# Patient Record
(demographics unavailable — no encounter records)

---

## 2025-01-08 NOTE — HISTORY OF PRESENT ILLNESS
[FreeTextEntry1] : 67 y/o with PMH of atrial fibrillation s/p ablation therapy, carotid bruit, mitral regurgitation, peripheral vascular disease, hyperlipidemia, NIDDM and long-term anticoagulant use presents for follow up. Patient denies chest pain, shortness of breath, dizziness, lightheadedness and edema.

## 2025-01-08 NOTE — DISCUSSION/SUMMARY
[FreeTextEntry1] : This is a 73 -year-old male with past medical history significant for atrial fibrillation, status post ablation, remains on anticoagulation with Xarelto 20 mg, hyperlipidemia, history of gout, murmur, non-insulin-dependent diabetes mellitus, who comes in for followup cardiac evaluation.  He denies chest pain, shortness of breath, dizziness or syncope. Electrocardiogram done January 8, 2025 demonstrate normal sinus rhythm rate 76 bpm is otherwise unremarkable. He will be getting new blood work done with his primary care physician in the next month. Lipid panel done September 17, 2024 demonstrated cholesterol of 159, HDL 39, LDL calculated 88 mg/dL, non-HDL cholesterol 120 mg/dL, and triglycerides 191 mg/dL Echo Doppler examination done at [facility] March 21, 2023 demonstrated normal left ventricular function with estimated ejection fraction of 60 to 65% trace mitral valve regurgitation, mildly thickened aortic valve leaflets, trace tricuspid valve regurgitation. He remain on his usual medications. The patient had a normal exercise stress test June 25, 2024. Lipid panel done March 19, 2024 demonstrated cholesterol 176, HDL of 40, LDL calculated 109, triglycerides 152, non-HDL cholesterol 136 mg/dL. Echo Doppler examination done at [facility] March 24, 2023 demonstrated trace mitral valve regurgitation, mildly thickened aortic valve leaflets with normal opening, trace tricuspid valve regurgitation, satisfactory left ventricular function with estimated ejection fraction of 60 to 65% The patient remained stable from a cardiac standpoint.   Lipid panel done January 13, 2024 demonstrated cholesterol 150, HDL 39, triglycerides 104, LDL calculated 92, non-HDL cholesterol 112, LDL direct 93 mg/dL. The patient will continue on Zetia therapy.  He is not interested in injectable therapy for lipid-lowering at this time. PMH: The patient was hospitalized at [facility] March 20 through March 24, 2023 with inflammation of his liver.  He was treated with antibiotics and steroids and sent home.  He reports that the clinicians felt that this inflammation was secondary to medication.  His hemoglobin A1c at that time was 8.4. He is currently seeing an endocrinologist and is on metformin and low-dose insulin with a goal of being on oral therapy. PMH: He had been taking Nexlezet but he was afraid to continue because of the potential hyperuricemia and increased risk for gout.  I discussed the role of PCSK9 injectable therapy with him.  He prefers to restart Zetia.  He does not wish to rechallenge himself at this time with statin therapy.  He reports that he had a rash in the past to statin therapy. Lipid panel done May 6, 2003 demonstrated cholesterol 170, HDL 32, triglycerides of 248, LDL calculated 89, LDL direct 105, non-HDL cholesterol 138 mg/dL with hemoglobin A1c at that time was 7.9. He will restart Zetia therapy and have repeat lipid profile in 6 months.  If he is against injectable therapy discussion regarding low-dose statin therapy versus the addition of cholestyramine therapy. Electrocardiogram done July 19, 2023 demonstrates normal sinus rhythm rate of 90 bpm and is otherwise unremarkable Lipid panel done January 5, 2023 demonstrate cholesterol 170, triglycerides 195, HDL of 42, LDL calculated 89 mg/dL non-HDL cholesterol 128 mg/dL.  Hemoglobin A1c was 7.4. He is motivated to work with a registered dietitian. Hopefully with lifestyle modification, continued aerobic activity, his lipid profile will improve. I discussed the role for WelChol as an additive to Zetia therapy to further reduce his LDL cholesterol.  He will take this under consideration.  He will have new blood work done June 2023.  Is certainly reasonable to use WelChol as 1 packet/day in addition to his Zetia therapy to further reduce his LDL cholesterol. He continues on Xarelto 20 mg in the evening. The patient had a normal exercise stress test October 14, 2022. Electrocardiogram done May 4, 2022 demonstrated normal sinus rhythm and rate of 82 bpm is otherwise unremarkable. He continues on anticoagulation with Xarelto therapy. He is on Zetia 10 mg daily because he was unable to tolerate statin therapy. Blood work done January fourth 2022 demonstrated hemoglobin A1c of 6.6, cholesterol 174, triglycerides 167, HDL of 45, non-HDL cholesterol 129 mg/dL Electrocardiogram done 9/21/2001 demonstrated normal sinus rhythm rate of 86 bpm is otherwise unremarkable. The patient has been stable on his current dose of Xarelto 20 mg in the evening. He will be following up with Dr. Hayes of electrophysiology.   Echo Doppler examination done July 20, 2020 demonstrated minimal mitral valve regurgitation, mild tricuspid valve regurgitation, minimal pulmonic valve regurgitation and satisfactory left ventricular function. The patient had normal exercise stress test July 28, 2020. The patient understands that aerobic exercises must be increased to 40 minutes 4 times per week. A detailed discussion of lifestyle modification was done today. The patient has a good understanding of the diagnosis, and treatment plan. Lifestyle modification was also outlined.

## 2025-01-08 NOTE — REASON FOR VISIT
[CV Risk Factors and Non-Cardiac Disease] : CV risk factors and non-cardiac disease [Arrhythmia/ECG Abnorrmalities] : arrhythmia/ECG abnormalities [Structural Heart and Valve Disease] : structural heart and valve disease [Other: ____] : [unfilled] [Follow-Up - Clinic] : a clinic follow-up of [Atrial Fibrillation] : atrial fibrillation [Hyperlipidemia] : hyperlipidemia [Mitral Regurgitation] : mitral regurgitation [Peripheral Vascular Disease] : peripheral vascular disease [FreeTextEntry3] : Dr. Mcgee [FreeTextEntry1] : s/p ablation therapy, NIDDM, murmur

## 2025-01-08 NOTE — PHYSICAL EXAM
[Well Developed] : well developed [Well Nourished] : well nourished [No Acute Distress] : no acute distress [Normal Venous Pressure] : normal venous pressure [No Carotid Bruit] : no carotid bruit [Normal S1, S2] : normal S1, S2 [No Rub] : no rub [Murmur] : murmur [Normal Rate] : normal [I] : a grade 1 [Clear Lung Fields] : clear lung fields [Good Air Entry] : good air entry [No Respiratory Distress] : no respiratory distress  [Soft] : abdomen soft [Non Tender] : non-tender [No Masses/organomegaly] : no masses/organomegaly [Normal Bowel Sounds] : normal bowel sounds [Normal Gait] : normal gait [No Edema] : no edema [No Cyanosis] : no cyanosis [No Clubbing] : no clubbing [No Varicosities] : no varicosities [No Rash] : no rash [No Skin Lesions] : no skin lesions [Moves all extremities] : moves all extremities [No Focal Deficits] : no focal deficits [Normal Speech] : normal speech [Alert and Oriented] : alert and oriented [Normal memory] : normal memory [General Appearance - Well Developed] : well developed [Normal Appearance] : normal appearance [Well Groomed] : well groomed [General Appearance - Well Nourished] : well nourished [No Deformities] : no deformities [General Appearance - In No Acute Distress] : no acute distress [Normal Conjunctiva] : the conjunctiva exhibited no abnormalities [Normal Oral Mucosa] : normal oral mucosa [Normal Jugular Venous A Waves Present] : normal jugular venous A waves present [Normal Jugular Venous V Waves Present] : normal jugular venous V waves present [No Jugular Venous Hoskins A Waves] : no jugular venous hoskins A waves [Respiration, Rhythm And Depth] : normal respiratory rhythm and effort [Auscultation Breath Sounds / Voice Sounds] : lungs were clear to auscultation bilaterally [Exaggerated Use Of Accessory Muscles For Inspiration] : no accessory muscle use [Bowel Sounds] : normal bowel sounds [Abdomen Soft] : soft [Abnormal Walk] : normal gait [Gait - Sufficient For Exercise Testing] : the gait was sufficient for exercise testing [Nail Clubbing] : no clubbing of the fingernails [Cyanosis, Localized] : no localized cyanosis [Skin Color & Pigmentation] : normal skin color and pigmentation [Skin Turgor] : normal skin turgor [] : no rash [Oriented To Time, Place, And Person] : oriented to person, place, and time [Affect] : the affect was normal [Mood] : the mood was normal [No Anxiety] : not feeling anxious [5th Left ICS - MCL] : palpated at the 5th LICS in the midclavicular line [Normal] : normal [No Precordial Heave] : no precordial heave was noted [Tachycardia] : tachycardic [Heart Rate ___] : [unfilled] bpm [Rhythm Regular] : regular [Normal S1] : normal S1 [Normal S2] : normal S2 [No Gallop] : no gallop heard [II] : a grade 2 [2+] : left 2+ [No Abnormalities] : the abdominal aorta was not enlarged and no bruit was heard [No Pitting Edema] : no pitting edema present [Right Femoral Bruit] : no bruit heard over the right femoral artery [Left Femoral Bruit] : no bruit heard over the left femoral artery [Apical Thrill] : no thrill palpable at the apex [S3] : no S3 [S4] : no S4 [Click] : no click [Pericardial Rub] : no pericardial rub [Right Carotid Bruit] : no bruit heard over the right carotid [Left Carotid Bruit] : no bruit heard over the left carotid

## 2025-03-11 NOTE — HISTORY OF PRESENT ILLNESS
[de-identified] : This is a 73 year-old gentleman with a history of diabetes mellitus, paroxysmal atrial fibrillation, status post upper GI bleed, who is here today for his annual well examination.

## 2025-03-11 NOTE — ASSESSMENT
[FreeTextEntry1] : Problems Diabetes mellitus Paroxysmal atrial fibrillation Status post upper GI bleed Assessment This is a 73-year-old gentleman with a history of paroxysmal atrial fibs, diabetes mellitus, hypertension who went a cardiac ablation a year ago.  He presently continues on his Antithrombin inhibitor.  His blood pressure and physical examination were normal except that he is mildly overweight and deconditioned.  I advised the patient to lose weight following his diet The patient is due for a CT of the abdomen to assess his adrenal tumor and also use advised to obtain the shingles vaccine

## 2025-03-11 NOTE — HEALTH RISK ASSESSMENT
[Never (0 pts)] : Never (0 points) [No falls in past year] : Patient reported no falls in the past year [0] : 2) Feeling down, depressed, or hopeless: Not at all (0) [PHQ-2 Negative - No further assessment needed] : PHQ-2 Negative - No further assessment needed [[id number]] : 0 [No] : does not take [None] : Patient does not have any barriers to medication adherence [Benzodiazepines] : benzodiazepines [Opioids] : opioids [Blood Thinners] : blood thinners [FreeTextEntry1] : Patient is not on opioids but he is on Xarelto for atrial fibs [Never] : Never [NO] : No [Patient reported colonoscopy was normal] : Patient reported colonoscopy was normal [HIV Test offered] : HIV Test offered [Hepatitis C test offered] : Hepatitis C test offered [Change in mental status noted] : No change in mental status noted [Language] : denies difficulty with language [Behavior] : denies difficulty with behavior [Learning/Retaining New Information] : denies difficulty learning/retaining new information [Handling Complex Tasks] : denies difficulty handling complex tasks [Reasoning] : denies difficulty with reasoning [Spatial Ability and Orientation] : denies difficulty with spatial ability and orientation [With Family] : lives with family [] :  [Fully functional (bathing, dressing, toileting, transferring, walking, feeding)] : Fully functional (bathing, dressing, toileting, transferring, walking, feeding) [Fully functional (using the telephone, shopping, preparing meals, housekeeping, doing laundry, using] : Fully functional and needs no help or supervision to perform IADLs (using the telephone, shopping, preparing meals, housekeeping, doing laundry, using transportation, managing medications and managing finances) [Reports changes in hearing] : Reports no changes in hearing [Reports changes in vision] : Reports no changes in vision [Reports normal functional visual acuity (ie: able to read med bottle)] : Reports normal functional visual acuity [Reports changes in dental health] : Reports no changes in dental health [Smoke Detector] : smoke detector [Carbon Monoxide Detector] : carbon monoxide detector

## 2025-03-11 NOTE — PHYSICAL EXAM
[Normal Sphincter Tone] : normal sphincter tone [No Mass] : no mass [Stool Occult Blood] : stool negative for occult blood [Urethral Meatus] : meatus normal [Urinary Bladder Findings] : the bladder was normal on palpation [Scrotum] : the scrotum was normal [Testes Mass (___cm)] : there were no testicular masses [Anus Abnormality] : the anus and perineum were normal [Rectal Exam - Rectum] : digital rectal exam was normal [Prostate Enlargement] : the prostate was not enlarged [Prostate Tenderness] : the prostate was not tender [No Prostate Nodules] : no prostate nodules [Normal] : affect was normal and insight and judgment were intact [Right Foot Was Examined] : Right foot ~C was examined [Left Foot Was Examined] : left foot ~C was examined [None] : no ulcers in either foot were found [] : both feet

## 2025-07-01 NOTE — REASON FOR VISIT
[CV Risk Factors and Non-Cardiac Disease] : CV risk factors and non-cardiac disease [Arrhythmia/ECG Abnorrmalities] : arrhythmia/ECG abnormalities [Structural Heart and Valve Disease] : structural heart and valve disease [Other: ____] : [unfilled] [FreeTextEntry3] : Dr. Mcgee [Follow-Up - Clinic] : a clinic follow-up of [Atrial Fibrillation] : atrial fibrillation [Hyperlipidemia] : hyperlipidemia [Mitral Regurgitation] : mitral regurgitation [Peripheral Vascular Disease] : peripheral vascular disease [FreeTextEntry1] : s/p ablation therapy, NIDDM, murmur

## 2025-07-01 NOTE — DISCUSSION/SUMMARY
[FreeTextEntry1] : This is a 73 -year-old male with past medical history significant for atrial fibrillation, status post ablation, remains on anticoagulation with Xarelto 20 mg, hyperlipidemia, history of gout, murmur, non-insulin-dependent diabetes mellitus, who comes in for followup cardiac evaluation. The patient is complaining of increased heart rate/palpitations.  He tried to record his heart rate with his Apple phone which demonstrated complete artifact and read a heart rate of 150 bpm. He tried to do the same in the office today where his electrocardiogram demonstrated normal sinus rhythm rate of 96 bpm.  I have asked him to purchase a Kardia device or even use a pulse oximeter.  He is concerned about his increased heart rate. He will have a 2-week event monitor placed today to evaluate his heart rate over the next 2 weeks. He understands he must increase his hydration.  He admits to not drinking enough water and was consuming at least 2 cups of coffee per day.  He denies chest pain, shortness of breath, dizziness or syncope. Electrocardiogram done July 1, 2025 demonstrated normal sinus rhythm rate of 96 bpm is otherwise remarkable for left atrial abnormality. The patient is encouraged to increase his volume intake to 6 to 8 glasses of water per day and I recommended he start with 2 bottles of Pedialyte. Further recommendations will be made after results of the event recorder are available for my review. I once again explained to him we will get a more accurate reading of his heart rate and rhythm with the Kardia device. Electrocardiogram done January 8, 2025 demonstrate normal sinus rhythm rate 76 bpm is otherwise unremarkable. He will be getting new blood work done with his primary care physician in the next month. Lipid panel done September 17, 2024 demonstrated cholesterol of 159, HDL 39, LDL calculated 88 mg/dL, non-HDL cholesterol 120 mg/dL, and triglycerides 191 mg/dL Echo Doppler examination done at [facility] March 21, 2023 demonstrated normal left ventricular function with estimated ejection fraction of 60 to 65% trace mitral valve regurgitation, mildly thickened aortic valve leaflets, trace tricuspid valve regurgitation. The patient had a normal exercise stress test June 25, 2024. Lipid panel done March 19, 2024 demonstrated cholesterol 176, HDL of 40, LDL calculated 109, triglycerides 152, non-HDL cholesterol 136 mg/dL. Echo Doppler examination done at [facility] March 24, 2023 demonstrated trace mitral valve regurgitation, mildly thickened aortic valve leaflets with normal opening, trace tricuspid valve regurgitation, satisfactory left ventricular function with estimated ejection fraction of 60 to 65% The patient remained stable from a cardiac standpoint.   Lipid panel done January 13, 2024 demonstrated cholesterol 150, HDL 39, triglycerides 104, LDL calculated 92, non-HDL cholesterol 112, LDL direct 93 mg/dL. The patient will continue on Zetia therapy.  He is not interested in injectable therapy for lipid-lowering at this time. PMH: The patient was hospitalized at [facility] March 20 through March 24, 2023 with inflammation of his liver.  He was treated with antibiotics and steroids and sent home.  He reports that the clinicians felt that this inflammation was secondary to medication.  His hemoglobin A1c at that time was 8.4. He is currently seeing an endocrinologist and is on metformin and low-dose insulin with a goal of being on oral therapy. PMH: He had been taking Nexlezet but he was afraid to continue because of the potential hyperuricemia and increased risk for gout.  I discussed the role of PCSK9 injectable therapy with him.  He prefers to restart Zetia.  He does not wish to rechallenge himself at this time with statin therapy.  He reports that he had a rash in the past to statin therapy. Lipid panel done May 6, 2003 demonstrated cholesterol 170, HDL 32, triglycerides of 248, LDL calculated 89, LDL direct 105, non-HDL cholesterol 138 mg/dL with hemoglobin A1c at that time was 7.9. He will restart Zetia therapy and have repeat lipid profile in 6 months.  If he is against injectable therapy discussion regarding low-dose statin therapy versus the addition of cholestyramine therapy. Electrocardiogram done July 19, 2023 demonstrates normal sinus rhythm rate of 90 bpm and is otherwise unremarkable Lipid panel done January 5, 2023 demonstrate cholesterol 170, triglycerides 195, HDL of 42, LDL calculated 89 mg/dL non-HDL cholesterol 128 mg/dL.  Hemoglobin A1c was 7.4. He is motivated to work with a registered dietitian. Hopefully with lifestyle modification, continued aerobic activity, his lipid profile will improve. I discussed the role for WelChol as an additive to Zetia therapy to further reduce his LDL cholesterol.  He will take this under consideration.  He will have new blood work done June 2023.  Is certainly reasonable to use WelChol as 1 packet/day in addition to his Zetia therapy to further reduce his LDL cholesterol. He continues on Xarelto 20 mg in the evening. The patient had a normal exercise stress test October 14, 2022. Electrocardiogram done May 4, 2022 demonstrated normal sinus rhythm and rate of 82 bpm is otherwise unremarkable. He continues on anticoagulation with Xarelto therapy. He is on Zetia 10 mg daily because he was unable to tolerate statin therapy. Blood work done January fourth 2022 demonstrated hemoglobin A1c of 6.6, cholesterol 174, triglycerides 167, HDL of 45, non-HDL cholesterol 129 mg/dL Electrocardiogram done 9/21/2001 demonstrated normal sinus rhythm rate of 86 bpm is otherwise unremarkable. The patient has been stable on his current dose of Xarelto 20 mg in the evening. He will be following up with Dr. Hayes of electrophysiology.   Echo Doppler examination done July 20, 2020 demonstrated minimal mitral valve regurgitation, mild tricuspid valve regurgitation, minimal pulmonic valve regurgitation and satisfactory left ventricular function. The patient had normal exercise stress test July 28, 2020. The patient understands that aerobic exercises must be increased to 40 minutes 4 times per week. A detailed discussion of lifestyle modification was done today. The patient has a good understanding of the diagnosis, and treatment plan. Lifestyle modification was also outlined.

## 2025-07-01 NOTE — HISTORY OF PRESENT ILLNESS
[FreeTextEntry1] : 65 y/o with PMH of atrial fibrillation s/p ablation therapy, carotid bruit, mitral regurgitation, peripheral vascular disease, hyperlipidemia, NIDDM and long-term anticoagulant use presents for follow up. Patient denies chest pain, shortness of breath, dizziness, lightheadedness and edema.

## 2025-07-01 NOTE — PHYSICAL EXAM
[Well Developed] : well developed [Well Nourished] : well nourished [No Acute Distress] : no acute distress [Normal Venous Pressure] : normal venous pressure [No Carotid Bruit] : no carotid bruit [Normal S1, S2] : normal S1, S2 [No Rub] : no rub [Murmur] : murmur [Normal Rate] : normal [I] : a grade 1 [Right Femoral Bruit] : no bruit heard over the right femoral artery [Left Femoral Bruit] : no bruit heard over the left femoral artery [Clear Lung Fields] : clear lung fields [Good Air Entry] : good air entry [No Respiratory Distress] : no respiratory distress  [Soft] : abdomen soft [Non Tender] : non-tender [No Masses/organomegaly] : no masses/organomegaly [Normal Bowel Sounds] : normal bowel sounds [Normal Gait] : normal gait [No Edema] : no edema [No Cyanosis] : no cyanosis [No Clubbing] : no clubbing [No Varicosities] : no varicosities [No Rash] : no rash [No Skin Lesions] : no skin lesions [Moves all extremities] : moves all extremities [No Focal Deficits] : no focal deficits [Normal Speech] : normal speech [Alert and Oriented] : alert and oriented [Normal memory] : normal memory [General Appearance - Well Developed] : well developed [Normal Appearance] : normal appearance [Well Groomed] : well groomed [General Appearance - Well Nourished] : well nourished [No Deformities] : no deformities [General Appearance - In No Acute Distress] : no acute distress [Normal Conjunctiva] : the conjunctiva exhibited no abnormalities [Normal Oral Mucosa] : normal oral mucosa [Normal Jugular Venous A Waves Present] : normal jugular venous A waves present [Normal Jugular Venous V Waves Present] : normal jugular venous V waves present [No Jugular Venous Hoskins A Waves] : no jugular venous hoskins A waves [Respiration, Rhythm And Depth] : normal respiratory rhythm and effort [Exaggerated Use Of Accessory Muscles For Inspiration] : no accessory muscle use [Auscultation Breath Sounds / Voice Sounds] : lungs were clear to auscultation bilaterally [Bowel Sounds] : normal bowel sounds [Abdomen Soft] : soft [Abnormal Walk] : normal gait [Gait - Sufficient For Exercise Testing] : the gait was sufficient for exercise testing [Nail Clubbing] : no clubbing of the fingernails [Cyanosis, Localized] : no localized cyanosis [Skin Color & Pigmentation] : normal skin color and pigmentation [Skin Turgor] : normal skin turgor [] : no rash [Oriented To Time, Place, And Person] : oriented to person, place, and time [Affect] : the affect was normal [Mood] : the mood was normal [No Anxiety] : not feeling anxious [5th Left ICS - MCL] : palpated at the 5th LICS in the midclavicular line [Normal] : normal [No Precordial Heave] : no precordial heave was noted [Apical Thrill] : no thrill palpable at the apex [Tachycardia] : tachycardic [Heart Rate ___] : [unfilled] bpm [Rhythm Regular] : regular [Normal S1] : normal S1 [Normal S2] : normal S2 [No Gallop] : no gallop heard [S3] : no S3 [S4] : no S4 [Click] : no click [Pericardial Rub] : no pericardial rub [II] : a grade 2 [Right Carotid Bruit] : no bruit heard over the right carotid [Left Carotid Bruit] : no bruit heard over the left carotid [2+] : left 2+ [No Abnormalities] : the abdominal aorta was not enlarged and no bruit was heard [No Pitting Edema] : no pitting edema present

## 2025-07-15 NOTE — HISTORY OF PRESENT ILLNESS
[FreeTextEntry8] : This is a 73-year-old patient with a history of diabetes mellitus, ASHD, cardiac arrhythmias who recently had a Holter monitor placed with with electrodes who is not complaining of rash at the site of the placement of the electrodes and also distant rash also he denies any fever chills

## 2025-07-15 NOTE — ASSESSMENT
[FreeTextEntry1] : This is a 73-year-old gentleman with a history of ASHD, elevated A1c, who had a Holter monitor placed and presently has a rash at the site of the electrodes on the Holter and also on the contralateral side the rash is not vesicular it is erythematous and painful and appears to wait to be an allergic dermatitis.  I treated him with prednisone 20 mg daily for 5 days and also placed him on Allegra 60 mg twice a day to take during the week.  And also prescribed high-dose of hydrocortisone cream to be applied to the rash 3 times a day.  I informed the patient to wash his hands after applying the cortisone cream

## 2025-07-15 NOTE — PHYSICAL EXAM
[Normal] : normal rate, regular rhythm, normal S1 and S2 and no murmur heard [de-identified] : Maculopapular rash on his left chest and also located on his right chest